# Patient Record
Sex: MALE | Race: WHITE | NOT HISPANIC OR LATINO | URBAN - METROPOLITAN AREA
[De-identification: names, ages, dates, MRNs, and addresses within clinical notes are randomized per-mention and may not be internally consistent; named-entity substitution may affect disease eponyms.]

---

## 2018-04-05 ENCOUNTER — NEW PATIENT (OUTPATIENT)
Dept: URBAN - METROPOLITAN AREA CLINIC 56 | Facility: CLINIC | Age: 53
End: 2018-04-05
Payer: MEDICARE

## 2018-04-05 DIAGNOSIS — Z79.4 LONG TERM (CURRENT) USE OF INSULIN: ICD-10-CM

## 2018-04-05 DIAGNOSIS — H04.123 TEAR FILM INSUFFICIENCY OF BILATERAL LACRIMAL GLANDS: ICD-10-CM

## 2018-04-05 DIAGNOSIS — E11.9 TYPE 2 DIABETES MELLITUS WITHOUT COMPLICATIONS: Primary | ICD-10-CM

## 2018-04-05 PROCEDURE — 92250 FUNDUS PHOTOGRAPHY W/I&R: CPT | Performed by: OPTOMETRIST

## 2018-04-05 PROCEDURE — 92004 COMPRE OPH EXAM NEW PT 1/>: CPT | Performed by: OPTOMETRIST

## 2018-04-05 ASSESSMENT — VISUAL ACUITY
OS: 20/20
OD: 20/20

## 2018-04-05 ASSESSMENT — INTRAOCULAR PRESSURE
OS: 16
OD: 16

## 2018-04-05 ASSESSMENT — KERATOMETRY
OS: 43.13
OD: 42.91

## 2021-03-02 ENCOUNTER — FOLLOW UP ESTABLISHED (OUTPATIENT)
Dept: URBAN - METROPOLITAN AREA CLINIC 56 | Facility: CLINIC | Age: 56
End: 2021-03-02
Payer: MEDICARE

## 2021-03-02 DIAGNOSIS — H25.13 AGE-RELATED NUCLEAR CATARACT, BILATERAL: ICD-10-CM

## 2021-03-02 DIAGNOSIS — Z79.84 LONG TERM (CURRENT) USE OF ORAL ANTIDIABETIC DRUGS: ICD-10-CM

## 2021-03-02 PROCEDURE — 92014 COMPRE OPH EXAM EST PT 1/>: CPT | Performed by: OPTOMETRIST

## 2021-03-02 PROCEDURE — 92134 CPTRZ OPH DX IMG PST SGM RTA: CPT | Performed by: OPTOMETRIST

## 2021-03-02 ASSESSMENT — VISUAL ACUITY
OS: 20/20
OD: 20/20

## 2021-03-02 ASSESSMENT — KERATOMETRY
OD: 42.99
OS: 43.05

## 2021-03-02 ASSESSMENT — INTRAOCULAR PRESSURE
OD: 15
OS: 16

## 2022-06-29 ENCOUNTER — OFFICE VISIT (OUTPATIENT)
Dept: URBAN - METROPOLITAN AREA CLINIC 56 | Facility: CLINIC | Age: 57
End: 2022-06-29
Payer: MEDICARE

## 2022-06-29 DIAGNOSIS — H04.123 DRY EYE SYNDROME OF BILATERAL LACRIMAL GLANDS: ICD-10-CM

## 2022-06-29 DIAGNOSIS — E11.9 TYPE 2 DIABETES MELLITUS WITHOUT COMPLICATIONS: Primary | ICD-10-CM

## 2022-06-29 DIAGNOSIS — H25.813 COMBINED FORMS OF AGE-RELATED CATARACT, BILATERAL: ICD-10-CM

## 2022-06-29 DIAGNOSIS — Z79.4 LONG TERM (CURRENT) USE OF INSULIN: ICD-10-CM

## 2022-06-29 PROCEDURE — 92014 COMPRE OPH EXAM EST PT 1/>: CPT | Performed by: OPTOMETRIST

## 2022-06-29 PROCEDURE — 92250 FUNDUS PHOTOGRAPHY W/I&R: CPT | Performed by: OPTOMETRIST

## 2022-06-29 PROCEDURE — 92134 CPTRZ OPH DX IMG PST SGM RTA: CPT | Performed by: OPTOMETRIST

## 2022-06-29 ASSESSMENT — KERATOMETRY
OD: 43.16
OS: 43.19

## 2022-06-29 ASSESSMENT — VISUAL ACUITY
OS: 20/20
OD: 20/20

## 2022-06-29 ASSESSMENT — INTRAOCULAR PRESSURE
OS: 14
OD: 14

## 2022-06-29 NOTE — IMPRESSION/PLAN
Impression: Type 2 diabetes mellitus without complications: B90.5. Plan: No diabetic retinopathy. Recommend yearly diabetic eye exam. Discussed with patient importance of good blood sugar control.

## 2022-06-29 NOTE — IMPRESSION/PLAN
Impression: Dry eye syndrome of bilateral lacrimal glands: H04.123.
-CPAP Plan: Explained chronic nature of condition- daily maintenance is needed and there is no cure. Start PF Artificial Tears QID OU. Start dry warm compresses BID for 5 minutes duration. 
Start Systane Gel (tube) QHS OU